# Patient Record
Sex: MALE
[De-identification: names, ages, dates, MRNs, and addresses within clinical notes are randomized per-mention and may not be internally consistent; named-entity substitution may affect disease eponyms.]

---

## 2023-05-04 PROBLEM — Z00.00 ENCOUNTER FOR PREVENTIVE HEALTH EXAMINATION: Status: ACTIVE | Noted: 2023-05-04

## 2023-05-08 ENCOUNTER — APPOINTMENT (OUTPATIENT)
Dept: UROLOGY | Facility: CLINIC | Age: 28
End: 2023-05-08
Payer: COMMERCIAL

## 2023-05-08 VITALS
RESPIRATION RATE: 18 BRPM | HEIGHT: 70 IN | TEMPERATURE: 97.9 F | BODY MASS INDEX: 29.35 KG/M2 | SYSTOLIC BLOOD PRESSURE: 128 MMHG | HEART RATE: 65 BPM | DIASTOLIC BLOOD PRESSURE: 75 MMHG | WEIGHT: 205 LBS | OXYGEN SATURATION: 98 %

## 2023-05-08 DIAGNOSIS — N46.9 MALE INFERTILITY, UNSPECIFIED: ICD-10-CM

## 2023-05-08 DIAGNOSIS — Z87.438 PERSONAL HISTORY OF OTHER DISEASES OF MALE GENITAL ORGANS: ICD-10-CM

## 2023-05-08 DIAGNOSIS — Z83.3 FAMILY HISTORY OF DIABETES MELLITUS: ICD-10-CM

## 2023-05-08 PROCEDURE — 99214 OFFICE O/P EST MOD 30 MIN: CPT

## 2023-05-08 RX ORDER — CETIRIZINE HCL 10 MG
TABLET ORAL
Refills: 0 | Status: ACTIVE | COMMUNITY

## 2023-05-11 PROBLEM — N46.9 MALE SUBFERTILITY: Status: ACTIVE | Noted: 2023-05-08

## 2023-05-11 PROBLEM — Z87.438 HISTORY OF MALE INFERTILITY: Status: RESOLVED | Noted: 2023-05-08 | Resolved: 2023-05-11

## 2023-05-11 RX ORDER — CLOMIPHENE CITRATE 50 MG/1
50 TABLET ORAL
Qty: 25 | Refills: 0 | Status: ACTIVE | COMMUNITY
Start: 2023-04-26

## 2023-05-11 NOTE — LETTER BODY
[Dear  ___] : Dear  [unfilled], [Consult Letter:] : I had the pleasure of evaluating your patient, [unfilled]. [Please see my note below.] : Please see my note below. [Consult Closing:] : Thank you very much for allowing me to participate in the care of this patient.  If you have any questions, please do not hesitate to contact me. [Sincerely,] : Sincerely, [FreeTextEntry3] : Dr. Jake Black M.D.

## 2023-05-11 NOTE — ADDENDUM
[FreeTextEntry1] : Next Visit: semen analysis review\par \par Fawn BENNETT completed this note as a scribe on behalf of Dr. Jake Black M.D. \par \par The documentation recorded by the scribe accuracy reflects the service I personally preformed and the decisions made by me.

## 2023-05-11 NOTE — HISTORY OF PRESENT ILLNESS
[FreeTextEntry1] : Patient is a 29 y/o  male with no prior urological studies who is reporting subfertility. He has no voiding complaints. Patient notes two separate traumatic testicular injuries many years ago; he still experiences some pain when heavy lifting. 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 The patient and his partner have successfully conceived once; she was pregnant for 8 weeks then experienced a miscarriage. He has not caused any other pregnancies and his partner has not been pregnant before or since this instance. His erections and ejaculation are adequate. He began taking Clomid one week ago and has experienced no adverse side effects. Patient denies nocturia, history of UTIs, history of STDs, dysuria, distinct penile curvature, smoking or gross hematuria. Ultrasound of the scrotum on 03/16/2023 revealed mild bilateral varicoceles up to 4 mm on the right and up to 4 mm on the left. His hormone pattern was normal.\par

## 2023-05-11 NOTE — ASSESSMENT
[FreeTextEntry1] : The patient presents with concerns of subfertility.  The patient had an extremely abnormal followed by somewhat abnormal semen analyses.  Scrotal ultrasound reported bilateral mild varicocele but physical examination today did not reveal any clinically significant varicocele.  I encouraged him to continue with clomiphene citrate for now and he will repeat the semen analysis in 2 months.  If this is not further normalized, then he will be referred for possible microsurgical varicocelectomy.

## 2023-05-11 NOTE — PHYSICAL EXAM
[General Appearance - Well Developed] : well developed [General Appearance - Well Nourished] : well nourished [Normal Appearance] : normal appearance [Well Groomed] : well groomed [General Appearance - In No Acute Distress] : no acute distress [Edema] : no peripheral edema [Respiration, Rhythm And Depth] : normal respiratory rhythm and effort [Exaggerated Use Of Accessory Muscles For Inspiration] : no accessory muscle use [Abdomen Soft] : soft [Abdomen Tenderness] : non-tender [Costovertebral Angle Tenderness] : no ~M costovertebral angle tenderness [Urethral Meatus] : meatus normal [Urinary Bladder Findings] : the bladder was normal on palpation [Scrotum] : the scrotum was normal [Testes Mass (___cm)] : there were no testicular masses [No Prostate Nodules] : no prostate nodules [Normal Station and Gait] : the gait and station were normal for the patient's age [] : no rash [No Focal Deficits] : no focal deficits [Oriented To Time, Place, And Person] : oriented to person, place, and time [Affect] : the affect was normal [Mood] : the mood was normal [Not Anxious] : not anxious [No Palpable Adenopathy] : no palpable adenopathy [Penis Abnormality] : normal circumcised penis [Epididymis] : the epididymides were normal [Testes Tenderness] : no tenderness of the testes [FreeTextEntry1] : +/- left varicocele

## 2023-08-22 ENCOUNTER — NON-APPOINTMENT (OUTPATIENT)
Age: 28
End: 2023-08-22

## 2023-09-27 ENCOUNTER — APPOINTMENT (OUTPATIENT)
Dept: UROLOGY | Facility: CLINIC | Age: 28
End: 2023-09-27